# Patient Record
Sex: MALE | URBAN - METROPOLITAN AREA
[De-identification: names, ages, dates, MRNs, and addresses within clinical notes are randomized per-mention and may not be internally consistent; named-entity substitution may affect disease eponyms.]

---

## 2022-02-04 ENCOUNTER — OFFICE VISIT (OUTPATIENT)
Dept: URBAN - METROPOLITAN AREA CLINIC 6 | Facility: CLINIC | Age: 22
End: 2022-02-04
Payer: COMMERCIAL

## 2022-02-04 DIAGNOSIS — H31.102 CHOROIDAL DEGENERATION, UNSPECIFIED, LEFT EYE: Primary | ICD-10-CM

## 2022-02-04 PROCEDURE — 92250 FUNDUS PHOTOGRAPHY W/I&R: CPT | Performed by: OPTOMETRIST

## 2022-02-04 PROCEDURE — 92014 COMPRE OPH EXAM EST PT 1/>: CPT | Performed by: OPTOMETRIST

## 2022-02-04 ASSESSMENT — INTRAOCULAR PRESSURE
OD: 19
OS: 22

## 2022-02-04 NOTE — IMPRESSION/PLAN
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 Uniform pigmentation centrally with gray border around. Baseline FP today. Not visually significant. Monitor.